# Patient Record
(demographics unavailable — no encounter records)

---

## 2025-02-06 NOTE — HISTORY OF PRESENT ILLNESS
[FreeTextEntry1] : ***Feb. 6, 2025*** Naeem feels much better he endorses daily high intensity cross training at his gym.  He is presently on mounjaro 5mg qwk, tresiba 24u qhs.  HE reports breakthrough cravings and appetite.  He lost a total of 47 lbs since september.  Endorses missing tresiba sometimes.  He is wearing a jessica 3 Date of download:  02/06/2025  Diabetes Medications and Dosage: as above  Indication for CGMS: verify a change in the treatment regimen, identify periods of hypoglycemia/ hyperglycemia.  Modal day report: pattern.  Pt with HYPO  0% of the time (NONE below 54),  88% in target range  Hyperglycemia:  12% elevation  Identified issues: carbohydrate counting  dates analyzed:  01/24/2025 - 02/06/2025 He denies subjective HYPOs,  ***Sept. 4, 2024*** Naeem feels well overall, denies new complaints.  He is presently on humulin n 20 u bid  bg in office 174mg/dL Lab review A1c 6.7%, Cr. 2.63, eGFR 27, ACr 4135 HISTORY OF PRESENT ILLNESS ***Aug. 14, 2024*** Dr. Merlyn Gomez hepatology [phone] Mr. DE LUNA was diagnosed with Diabetes Mellitus Type 2 in 2005 s/p liver transplant and arrives today for initial evaluation of glycemic control.   He reports history of obesity BMI 18.75, HTN, dyslipidemia, and denies CAD, known complications of retinopathy, nephropathy, or neuropathy. He is presently on Humulin N 20 units BID   Blood sugars are checked 2 times a day.   Did not bring logbook, but reported are typically as following:    Hypoglycemia frequency: Pt reports subjective HYPOs maybe twice a year   Fingerstick glucose in the office today is 149 mg/dL 1.5 hours after eating.   Diet: not following ADA, Mostly vegetarian, poultry or red meat 2-3 times a week.     Exercise: gym circuit strength training 5-7 times per week. Retired.   Lab review: a1c- 7.0%   Last dilated eye -  Last podiatry visit  -  Last cardiology evaluation -  Last stress test -  Last 2-D Echo -  Last nephrology evaluation -  Last neurology evaluation-

## 2025-02-06 NOTE — ASSESSMENT
[FreeTextEntry1] : T2DM, obesity -Increase Mounjaro 7.5 mg qwk  -BON 3 -Blood work results discussed today -Continue Tresiba 24u qhs sample pen provided -Reiterated the importance of daily adherence to LA insulin  -Must focus on reducing carbs per meal -continue excellent exercise regimen -request most january 2025 blood work from Hepatologist Dr. Gomez  RTC 3 months     Diabetes Counseling: The patient was counseled on diabetes foot care, long term vascular complications of diabetes, carbohydrate consistent diet, importance of diet and exercise to improve glycemic control, achieve weight loss and improve cardiovascular health, exercise/effect on glucose, hypoglycemia management, glucagon use, ketone testing, action and use of short and long-acting insulin, self-monitoring of blood glucose, insulin self-administration, injection technique, storage, and sharps disposal and sick-day management.  Patient was referred to ophthalmology for retinopathy screening. Risks and benefits of diabetic medications were discussed.